# Patient Record
Sex: FEMALE | Race: WHITE | NOT HISPANIC OR LATINO | Employment: OTHER | URBAN - NONMETROPOLITAN AREA
[De-identification: names, ages, dates, MRNs, and addresses within clinical notes are randomized per-mention and may not be internally consistent; named-entity substitution may affect disease eponyms.]

---

## 2017-04-14 ENCOUNTER — TELEPHONE (OUTPATIENT)
Dept: SURGERY | Facility: CLINIC | Age: 62
End: 2017-04-14

## 2017-04-14 NOTE — TELEPHONE ENCOUNTER
----- Message from Lucero Hilario sent at 3/29/2017  3:49 PM EDT -----  Regarding: OPEN ACCESS COLONOSCOPY  WOULD LIKE PT SCHEDULED FOR COLONOSCOPY

## 2017-04-17 ENCOUNTER — PREP FOR SURGERY (OUTPATIENT)
Dept: SURGERY | Facility: CLINIC | Age: 62
End: 2017-04-17

## 2017-04-17 DIAGNOSIS — Z12.11 ENCOUNTER FOR SCREENING COLONOSCOPY: Primary | ICD-10-CM

## 2017-05-01 ENCOUNTER — TELEPHONE (OUTPATIENT)
Dept: SURGERY | Facility: CLINIC | Age: 62
End: 2017-05-01

## 2022-03-23 ENCOUNTER — OFFICE VISIT (OUTPATIENT)
Dept: BEHAVIORAL HEALTH | Facility: CLINIC | Age: 67
End: 2022-03-23

## 2022-03-23 VITALS
WEIGHT: 118 LBS | HEIGHT: 65 IN | BODY MASS INDEX: 19.66 KG/M2 | SYSTOLIC BLOOD PRESSURE: 120 MMHG | DIASTOLIC BLOOD PRESSURE: 78 MMHG

## 2022-03-23 DIAGNOSIS — F33.1 MODERATE EPISODE OF RECURRENT MAJOR DEPRESSIVE DISORDER: Primary | ICD-10-CM

## 2022-03-23 PROCEDURE — 90792 PSYCH DIAG EVAL W/MED SRVCS: CPT | Performed by: NURSE PRACTITIONER

## 2022-03-23 RX ORDER — FLUOXETINE HYDROCHLORIDE 40 MG/1
CAPSULE ORAL
Qty: 60 CAPSULE | Refills: 3 | Status: SHIPPED | OUTPATIENT
Start: 2022-03-23

## 2022-03-23 RX ORDER — DIPHENOXYLATE HYDROCHLORIDE AND ATROPINE SULFATE 2.5; .025 MG/1; MG/1
TABLET ORAL DAILY
COMMUNITY

## 2022-03-23 RX ORDER — HYDROXYZINE PAMOATE 25 MG/1
CAPSULE ORAL
COMMUNITY
Start: 2022-02-18

## 2022-03-23 RX ORDER — NORTRIPTYLINE HYDROCHLORIDE 50 MG/1
100 CAPSULE ORAL
COMMUNITY
Start: 2022-03-15

## 2022-03-23 RX ORDER — FLUOXETINE HYDROCHLORIDE 20 MG/1
20 CAPSULE ORAL 3 TIMES DAILY
COMMUNITY
Start: 2022-03-22 | End: 2022-03-23

## 2022-03-23 RX ORDER — ALBUTEROL SULFATE 90 UG/1
2 AEROSOL, METERED RESPIRATORY (INHALATION) EVERY 4 HOURS PRN
COMMUNITY
Start: 2022-03-15

## 2022-03-23 RX ORDER — OXYCODONE AND ACETAMINOPHEN 7.5; 325 MG/1; MG/1
1 TABLET ORAL EVERY 8 HOURS
COMMUNITY
Start: 2022-03-15

## 2022-03-23 RX ORDER — FLUTICASONE PROPIONATE 50 MCG
SPRAY, SUSPENSION (ML) NASAL
COMMUNITY
Start: 2022-03-09

## 2022-03-23 RX ORDER — OMEPRAZOLE 40 MG/1
40 CAPSULE, DELAYED RELEASE ORAL DAILY
COMMUNITY
Start: 2022-02-08

## 2022-03-23 NOTE — PROGRESS NOTES
Patient Name: Karrie Horton  MRN: [id number]   :  1955     Referring Physician: Andrew Long MD    Chief Complaint:     ICD-10-CM ICD-9-CM   1. Moderate episode of recurrent major depressive disorder (HCC)  F33.1 296.32       HPI:   Karrie Horton is a 67 y.o. female who is here today for initial evaluation of Depression.  Patient states in  she was in a deep depression that she could not come out of.  [facility]ent to [facility] and by report was diagnosed with severe bipolar disorder.  Was placed on 60 mg of Prozac and has been on that since.  Patient has tried other medications in the past including Paxil which did not produce any results.  Patient states she has been having more depression lately and wonders if she is grown tolerant to the Prozac.  Feels that has done a great job over the years.  Sleep is good as she takes nortriptyline.  Has a very tense sleep if she sleeps without it.  Patient has one of her sons and his 4 and 10-year-old children living with her since December.  This brings some stress to the patient.  Patient states she also has an older son as well.  Patient's father is 93 years old and lives down the road from the patient.  She and her sister take turns caring for him as he started falling and not safe to be by himself.  This is also added some stress.    Past Medical History:   History reviewed. No pertinent past medical history.    Past Surgical History:   History reviewed. No pertinent surgical history.    Social History:   Social History     Socioeconomic History   • Marital status: Unknown   Tobacco Use   • Smoking status: Current Every Day Smoker     Packs/day: 0.50     Years: 54.00     Pack years: 27.00     Types: Cigarettes   • Smokeless tobacco: Never Used   Vaping Use   • Vaping Use: Never used   Substance and Sexual Activity   • Alcohol use: Never       Family History:  History reviewed. No pertinent family history.    Allergy:  Allergies   Allergen Reactions    • Codeine Nausea Only       Current Medications:   Current Outpatient Medications   Medication Sig Dispense Refill   • albuterol sulfate  (90 Base) MCG/ACT inhaler Inhale 2 puffs Every 4 (Four) Hours As Needed.     • fluticasone (FLONASE) 50 MCG/ACT nasal spray INHALE ONE SPRAY IN EACH NOSTRIL TWICE DAILY     • hydrOXYzine pamoate (VISTARIL) 25 MG capsule TAKE 1 CAPSULE EVERY 8 HOURS AS NEEDED     • multivitamin (THERAGRAN) tablet tablet Take  by mouth Daily.     • nortriptyline (PAMELOR) 50 MG capsule Take 100 mg by mouth every night at bedtime.     • omeprazole (priLOSEC) 40 MG capsule Take 40 mg by mouth Daily.     • oxyCODONE-acetaminophen (PERCOCET) 7.5-325 MG per tablet Take 1 tablet by mouth Every 8 (Eight) Hours.     • Rosuvastatin Calcium (CRESTOR PO) Take  by mouth.     • FLUoxetine (PROzac) 40 MG capsule Take 2 tablets once per day. 60 capsule 3     No current facility-administered medications for this visit.       Lab Results:   No visits with results within 3 Month(s) from this visit.   Latest known visit with results is:   No results found for any previous visit.       Review of Symptoms:   Review of Systems   Constitutional: Negative for activity change, appetite change, fatigue, unexpected weight gain and unexpected weight loss.   Respiratory: Negative for shortness of breath and wheezing.    Gastrointestinal: Negative for constipation, diarrhea, nausea and vomiting.   Musculoskeletal: Negative for gait problem.   Skin: Negative for dry skin and rash.   Neurological: Negative for dizziness, speech difficulty, weakness, light-headedness, headache, memory problem and confusion.   Psychiatric/Behavioral: Positive for depressed mood. Negative for agitation, behavioral problems, decreased concentration, dysphoric mood, hallucinations, self-injury, sleep disturbance, suicidal ideas, negative for hyperactivity and stress. The patient is nervous/anxious.        Physical Exam:   Physical Exam  Vitals  "and nursing note reviewed.   Constitutional:       General: She is not in acute distress.     Appearance: She is well-developed. She is not diaphoretic.   HENT:      Head: Normocephalic and atraumatic.   Eyes:      Conjunctiva/sclera: Conjunctivae normal.   Cardiovascular:      Rate and Rhythm: Normal rate.   Pulmonary:      Effort: Pulmonary effort is normal. No respiratory distress.   Musculoskeletal:         General: Normal range of motion.      Cervical back: Full passive range of motion without pain and normal range of motion.   Skin:     General: Skin is warm and dry.   Neurological:      Mental Status: She is alert and oriented to person, place, and time.   Psychiatric:         Mood and Affect: Mood is anxious and depressed. Affect is not labile, blunt, angry or inappropriate.         Speech: Speech is not rapid and pressured or tangential.         Behavior: Behavior normal. Behavior is not agitated, slowed, aggressive, withdrawn, hyperactive or combative. Behavior is cooperative.         Thought Content: Thought content normal. Thought content is not paranoid or delusional. Thought content does not include homicidal or suicidal ideation. Thought content does not include homicidal or suicidal plan.         Judgment: Judgment normal.       Blood pressure 120/78, height 165.1 cm (65\"), weight 53.5 kg (118 lb).  Body mass index is 19.64 kg/m².     Mental Status Exam:   Appearance: appropriate  Hygiene:   good  Cooperation:  Cooperative  Eye Contact:  Good  Psychomotor Behavior:  Appropriate  Mood:anxious and depressed  Affect:  Appropriate  Hopelessness: Denies  Speech:  Normal  Thought Process:  Goal directed  Thought Content:  Normal  Suicidal:  None  Homicidal:  None  Hallucinations:  None  Delusion:  None  Memory:  Intact  Orientation:  Person, Place, Time and Situation  Reliability:  good  Insight:  Good  Judgement:  Good  Impulse Control:  Good  Physical/Medical Issues:  No     PHQ-9 Depression " Screening  Little interest or pleasure in doing things?     Feeling down, depressed, or hopeless?     Trouble falling or staying asleep, or sleeping too much?     Feeling tired or having little energy?     Poor appetite or overeating?     Feeling bad about yourself - or that you are a failure or have let yourself or your family down?     Trouble concentrating on things, such as reading the newspaper or watching television?     Moving or speaking so slowly that other people could have noticed? Or the opposite - being so fidgety or restless that you have been moving around a lot more than usual?     Thoughts that you would be better off dead, or of hurting yourself in some way?     PHQ-9 Total Score     If you checked off any problems, how difficult have these problems made it for you to do your work, take care of things at home, or get along with other people?        Assessment/Plan:   Diagnoses and all orders for this visit:    1. Moderate episode of recurrent major depressive disorder (HCC) (Primary)  -     FLUoxetine (PROzac) 40 MG capsule; Take 2 tablets once per day.  Dispense: 60 capsule; Refill: 3    Prozac has done well with the patient for quite some time she just seems tolerant to the dose.  We will increase Prozac to 80 mg daily.    A psychological evaluation was conducted in order to assess past and current level of functioning. Areas assessed included, but were not limited to: perception of social support, perception of ability to face and deal with challenges in life (positive functioning), anxiety symptoms, depressive symptoms, perspective on beliefs/belief system, coping skills for stress, intelligence level,  Therapeutic rapport was established. Interventions conducted today were geared towards incorporating medication management along with support for continued therapy. Education was also provided as to the med management with this provider and what to expect in subsequent sessions.    We discussed  risks, benefits,goals and side effects of the above medication and the patient was agreeable with the plan.Patient was educated on the importance of compliance with treatment and follow-up appointments. Patient is aware to contact the [facility] with any worsening of symptoms. To call for questions or concerns and return early if necessary. Patent is agreeable to go to the Emergency Department or call 911 should they begin SI/HI.     Treatment Plan:   Discussed risks, benefits, and alternatives of medication. Encouraged healthy habits (eating, exercise and sleep). Call if any questions or problems arise. Medication reconciled. Controlled substance monitoring report reviewed. Provided psychoeducation.. Discussed coping strategies and current stressors. Set appropriate boundaries and limits for patient's well-being. Use distraction techniques to improve symptoms. Access support networks.      Return in about 4 weeks (around 4/20/2022) for Follow Up 15 min.    Candace Thacker, JOHN